# Patient Record
Sex: MALE | Race: WHITE | NOT HISPANIC OR LATINO | URBAN - NONMETROPOLITAN AREA
[De-identification: names, ages, dates, MRNs, and addresses within clinical notes are randomized per-mention and may not be internally consistent; named-entity substitution may affect disease eponyms.]

---

## 2017-09-15 ENCOUNTER — OFFICE VISIT (OUTPATIENT)
Dept: ORTHOPEDIC SURGERY | Facility: CLINIC | Age: 57
End: 2017-09-15

## 2017-09-15 VITALS — RESPIRATION RATE: 18 BRPM | BODY MASS INDEX: 27 KG/M2 | HEIGHT: 66 IN | WEIGHT: 168 LBS

## 2017-09-15 DIAGNOSIS — M79.642 LEFT HAND PAIN: Primary | ICD-10-CM

## 2017-09-15 DIAGNOSIS — S62.619A AVULSION FRACTURE OF PROXIMAL PHALANX OF FINGER, CLOSED, INITIAL ENCOUNTER: Primary | ICD-10-CM

## 2017-09-15 DIAGNOSIS — S63.259A DISLOCATION, FINGER CLOSED, INITIAL ENCOUNTER: ICD-10-CM

## 2017-09-15 PROCEDURE — 99203 OFFICE O/P NEW LOW 30 MIN: CPT | Performed by: PHYSICIAN ASSISTANT

## 2017-09-15 RX ORDER — VITAMIN E 268 MG
400 CAPSULE ORAL
COMMUNITY

## 2017-09-15 RX ORDER — FLUTICASONE PROPIONATE 50 MCG
2 SPRAY, SUSPENSION (ML) NASAL
COMMUNITY
Start: 2013-09-30

## 2017-09-15 NOTE — PROGRESS NOTES
Subjective   Patient ID: Anthony Romo is a 56 y.o. right hand dominant male is here today for a treatment planning discussion. Pain and Dislocation of the Left Hand         History of Present Illness  Patient presents as a new patient with complaints of left fifth finger pain.  He states earlier this morning he was trying to break up a 20 pound bag of ice when he got his finger caught on the bag and noticed a gross deformity.  He was seen at the urgent treatment center and was sent to our office for further eval.  Patient states approximately 30+ years ago he dislocated the same finger during a sports related injury.      Pain Score: worst possible pain  Pain Location: Finger (Comment which one)  Pain Orientation: Left     Pain Descriptors: Aching, Burning     Result of Injury: Yes (breaking up a 20 pound bag of ice)  Work-Related Injury: No    Past Medical History:   Diagnosis Date   • Dislocation of finger 09/15/2017    left pinky finger        Past Surgical History:   Procedure Laterality Date   • BACK SURGERY     • HERNIA REPAIR         Family History   Problem Relation Age of Onset   • Family history unknown: Yes       Social History     Social History   • Marital status: Single     Spouse name: N/A   • Number of children: N/A   • Years of education: N/A     Occupational History   • Not on file.     Social History Main Topics   • Smoking status: Never Smoker   • Smokeless tobacco: Not on file   • Alcohol use Yes      Comment: socially   • Drug use: No   • Sexual activity: Defer     Other Topics Concern   • Not on file     Social History Narrative       No Known Allergies    Review of Systems   Constitutional: Negative for fever.   HENT: Negative for voice change.    Eyes: Negative for visual disturbance.   Respiratory: Negative for shortness of breath.    Cardiovascular: Negative for chest pain.   Gastrointestinal: Negative for abdominal distention and abdominal pain.   Genitourinary: Negative for dysuria.  "  Musculoskeletal: Positive for arthralgias. Negative for gait problem and joint swelling.   Skin: Negative for rash.   Neurological: Negative for speech difficulty.   Hematological: Does not bruise/bleed easily.   Psychiatric/Behavioral: Negative for confusion.       Objective   Resp 18  Ht 66\" (167.6 cm)  Wt 168 lb (76.2 kg)  BMI 27.12 kg/m2   Physical Exam   Constitutional: He is oriented to person, place, and time. He appears well-nourished.   Neck: No tracheal deviation present.   Musculoskeletal:        Left wrist: He exhibits normal range of motion, no tenderness and no bony tenderness.        Left hand: He exhibits tenderness, deformity (gross deformity to the 5th digit at pip joint) and swelling. He exhibits normal capillary refill. Normal sensation noted.   Neurological: He is alert and oriented to person, place, and time.   Skin: No rash noted.   Psychiatric: He has a normal mood and affect.   Vitals reviewed.    Ortho Exam     Neurologic Exam     Mental Status   Oriented to person, place, and time.      Ortho Exam     No snuffbox ttp      Assessment/Plan   Independent Review of Radiographic Studies:      X-ray from urgent treatment center was reviewed.  There is a dislocation to the left fifth digit at the PIP joint with a small cortical calcification adjacent to the distal proximal phalanx    X-ray in the office post reduction of the left fifth finger reveals successful reduction of the fifth digit PIP dislocation    Orthopedic Injury Treatment  Date/Time: 9/15/2017 1:25 PM  Performed by: ROCHELLE ASH  Authorized by: ROCHELLE ASH   Consent: Verbal consent obtained.  Consent given by: patient  Patient understanding: patient states understanding of the procedure being performed  Patient consent: the patient's understanding of the procedure matches consent given  Patient identity confirmed: verbally with patient  Injury location: finger  Location details: left little finger  Injury " type: dislocation  Dislocation type: PIP  Pre-procedure neurovascular assessment: neurovascularly intact  Pre-procedure distal perfusion: normal  Pre-procedure neurological function: normal  Pre-procedure range of motion: normal  Anesthesia: digital block    Anesthesia:  Local anesthesia used: yes  Local Anesthetic: lidocaine 1% without epinephrine  Anesthetic total: 5 mL    Sedation:  Patient sedated: no  Manipulation performed: yes  Reduction successful: yes  X-ray confirmed reduction: yes  Splint type: static finger  Post-procedure neurovascular assessment: post-procedure neurovascularly intact  Post-procedure distal perfusion: normal  Post-procedure neurological function: normal  Post-procedure range of motion: normal  Patient tolerance: Patient tolerated the procedure well with no immediate complications        .     Anthony was seen today for pain and dislocation.    Diagnoses and all orders for this visit:    Avulsion fracture of proximal phalanx of finger, closed, initial encounter    Dislocation, finger closed, initial encounter  -     Orthopedic Injury Treatment    Other orders  -     HYDROcodone-acetaminophen (NORCO) 5-325 MG per tablet; Take 1 tablet by mouth Every 12 (Twelve) Hours As Needed (for pain).       Orthopedic activities reviewed and patient expressed appreciation  Discussion of orthopedic goals  Risk, benefits, and merits of treatment alternatives reviewed with the patient and questions answered  Use brace as instructed  Reduced physical activity as appropriate  Weight bearing parameters reviewed    Recommendations/Plan:  Brace: aluminum finger splint  Patient is encouraged to call or return for any issues or concerns.    Patient states he is  Going back to his hometown of [place] this afternoon.   He was placed in an aluminum finger splint to the left fifth digit.  He is advised to keep this in place.  Follow-up with an orthopedist in his local hometown he agrees.  Patient agreeable to call or  return sooner for any concerns.

## 2017-09-16 RX ORDER — HYDROCODONE BITARTRATE AND ACETAMINOPHEN 5; 325 MG/1; MG/1
1 TABLET ORAL EVERY 12 HOURS PRN
Qty: 14 TABLET | Refills: 0 | Status: SHIPPED | OUTPATIENT
Start: 2017-09-16